# Patient Record
Sex: FEMALE | Race: BLACK OR AFRICAN AMERICAN | Employment: FULL TIME | URBAN - METROPOLITAN AREA
[De-identification: names, ages, dates, MRNs, and addresses within clinical notes are randomized per-mention and may not be internally consistent; named-entity substitution may affect disease eponyms.]

---

## 2019-10-28 ENCOUNTER — APPOINTMENT (OUTPATIENT)
Dept: GENERAL RADIOLOGY | Age: 39
End: 2019-10-28
Attending: PHYSICIAN ASSISTANT
Payer: SELF-PAY

## 2019-10-28 ENCOUNTER — HOSPITAL ENCOUNTER (EMERGENCY)
Age: 39
Discharge: HOME | End: 2019-10-28
Attending: EMERGENCY MEDICINE | Admitting: EMERGENCY MEDICINE
Payer: SELF-PAY

## 2019-10-28 VITALS
OXYGEN SATURATION: 98 % | SYSTOLIC BLOOD PRESSURE: 138 MMHG | RESPIRATION RATE: 16 BRPM | TEMPERATURE: 98 F | DIASTOLIC BLOOD PRESSURE: 84 MMHG | HEART RATE: 80 BPM

## 2019-10-28 DIAGNOSIS — M79.605 LEFT LEG PAIN: ICD-10-CM

## 2019-10-28 DIAGNOSIS — M54.2 NECK PAIN ON LEFT SIDE: ICD-10-CM
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
## 2019-10-28 DIAGNOSIS — M54.6 ACUTE LEFT-SIDED THORACIC BACK PAIN: ICD-10-CM

## 2019-10-28 PROCEDURE — 99283 EMERGENCY DEPT VISIT LOW MDM: CPT

## 2019-10-28 PROCEDURE — 72072 X-RAY EXAM THORAC SPINE 3VWS: CPT

## 2019-10-28 PROCEDURE — 72050 X-RAY EXAM NECK SPINE 4/5VWS: CPT

## 2019-10-28 PROCEDURE — 73502 X-RAY EXAM HIP UNI 2-3 VIEWS: CPT

## 2019-10-28 PROCEDURE — 73030 X-RAY EXAM OF SHOULDER: CPT

## 2019-10-28 PROCEDURE — 74011250637 HC RX REV CODE- 250/637: Performed by: PHYSICIAN ASSISTANT

## 2019-10-28 PROCEDURE — 73552 X-RAY EXAM OF FEMUR 2/>: CPT

## 2019-10-28 RX ORDER — IBUPROFEN 600 MG/1
600 TABLET ORAL
Status: COMPLETED | OUTPATIENT
Start: 2019-10-28 | End: 2019-10-28

## 2019-10-28 RX ORDER — METHOCARBAMOL 750 MG/1
750 TABLET, FILM COATED ORAL 4 TIMES DAILY
Qty: 15 TAB | Refills: 0 | Status: SHIPPED | OUTPATIENT
Start: 2019-10-28 | End: 2019-10-28

## 2019-10-28 RX ORDER — NAPROXEN 500 MG/1
500 TABLET ORAL 2 TIMES DAILY WITH MEALS
Qty: 20 TAB | Refills: 0 | Status: SHIPPED | OUTPATIENT
Start: 2019-10-28 | End: 2019-10-28

## 2019-10-28 RX ORDER — METHOCARBAMOL 750 MG/1
750 TABLET, FILM COATED ORAL 4 TIMES DAILY
Qty: 15 TAB | Refills: 0 | Status: SHIPPED | OUTPATIENT
Start: 2019-10-28 | End: 2021-07-30

## 2019-10-28 RX ORDER — NAPROXEN 500 MG/1
500 TABLET ORAL 2 TIMES DAILY WITH MEALS
Qty: 20 TAB | Refills: 0 | Status: SHIPPED | OUTPATIENT
Start: 2019-10-28 | End: 2019-11-07

## 2019-10-28 RX ADMIN — IBUPROFEN 600 MG: 600 TABLET, FILM COATED ORAL at 09:35
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
## 2019-10-28 NOTE — ED NOTES
Patient given discharge instructions and prescriptions with work note. Pt verbalized understanding and ambulated out of ER with son.

## 2019-10-28 NOTE — LETTER
Luz Gutierrez 53 
[address] [facility] [address] [id number] 
[phone] Work/School Note Date: 10/28/2019 To Whom It May concern: 
 
Trav Garcias was seen and treated today in the emergency room by the following provider(s): 
Attending Provider: Aide Cabrera MD 
Physician Assistant: Rex Herrera. Trav Garcias may return to work on Wednesday October 30, 2019 Sincerely, Megan HERNANDEZ [facility]

## 2019-10-28 NOTE — DISCHARGE INSTRUCTIONS
Patient Education        Back Pain: Care Instructions  Your Care Instructions    Back pain has many possible causes. It is often related to problems with muscles and ligaments of the back. It may also be related to problems with the nerves, discs, or bones of the back. Moving, lifting, standing, sitting, or sleeping in an awkward way can strain the back. Sometimes you don't notice the injury until later. Arthritis is another common cause of back pain. Although it may hurt a lot, back pain usually improves on its own within several weeks. Most people recover in 12 weeks or less. Using good home treatment and being careful not to stress your back can help you feel better sooner. Follow-up care is a key part of your treatment and safety. Be sure to make and go to all appointments, and call your doctor if you are having problems. It's also a good idea to know your test results and keep a list of the medicines you take. How can you care for yourself at home? · Sit or lie in positions that are most comfortable and reduce your pain. Try one of these positions when you lie down:  ? Lie on your back with your knees bent and supported by large pillows. ? Lie on the floor with your legs on the seat of a sofa or chair. ? Lie on your side with your knees and hips bent and a pillow between your legs. ? Lie on your stomach if it does not make pain worse. · Do not sit up in bed, and avoid soft couches and twisted positions. Bed rest can help relieve pain at first, but it delays healing. Avoid bed rest after the first day of back pain. · Change positions every 30 minutes. If you must sit for long periods of time, take breaks from sitting. Get up and walk around, or lie in a comfortable position. · Try using a heating pad on a low or medium setting for 15 to 20 minutes every 2 or 3 hours. Try a warm shower in place of one session with the heating pad. · You can also try an ice pack for 10 to 15 minutes every 2 to 3 hours. Put a thin cloth between the ice pack and your skin. · Take pain medicines exactly as directed. ? If the doctor gave you a prescription medicine for pain, take it as prescribed. ? If you are not taking a prescription pain medicine, ask your doctor if you can take an over-the-counter medicine. · Take short walks several times a day. You can start with 5 to 10 minutes, 3 or 4 times a day, and work up to longer walks. Walk on level surfaces and avoid hills and stairs until your back is better. · Return to work and other activities as soon as you can. Continued rest without activity is usually not good for your back. · To prevent future back pain, do exercises to stretch and strengthen your back and stomach. Learn how to use good posture, safe lifting techniques, and proper body mechanics. When should you call for help? Call your doctor now or seek immediate medical care if:    · You have new or worsening numbness in your legs.     · You have new or worsening weakness in your legs. (This could make it hard to stand up.)     · You lose control of your bladder or bowels.    Watch closely for changes in your health, and be sure to contact your doctor if:    · You have a fever, lose weight, or don't feel well.     · You do not get better as expected. Where can you learn more? Go to http://elisa-buffy.info/. Enter W154 in the search box to learn more about \"Back Pain: Care Instructions. \"  Current as of: June 26, 2019  Content Version: 12.2  © 7363-2885 [employer]. Care instructions adapted under license by [employer] (which disclaims liability or warranty for this information). If you have questions about a medical condition or this instruction, always ask your healthcare professional. [place] [address] any warranty or liability for your use of this information.

## 2020-08-05 ENCOUNTER — VIRTUAL VISIT (OUTPATIENT)
Dept: FAMILY MEDICINE CLINIC | Age: 40
End: 2020-08-05
Payer: COMMERCIAL

## 2020-08-05 ENCOUNTER — TELEPHONE (OUTPATIENT)
Dept: FAMILY MEDICINE CLINIC | Age: 40
End: 2020-08-05

## 2020-08-05 DIAGNOSIS — H52.13 MYOPIA OF BOTH EYES: ICD-10-CM

## 2020-08-05 DIAGNOSIS — H53.9 VISUAL CHANGES: ICD-10-CM

## 2020-08-05 DIAGNOSIS — Z13.1 SCREENING FOR DIABETES MELLITUS: ICD-10-CM

## 2020-08-05 DIAGNOSIS — I10 ESSENTIAL HYPERTENSION: ICD-10-CM

## 2020-08-05 DIAGNOSIS — I10 ESSENTIAL HYPERTENSION: Primary | ICD-10-CM

## 2020-08-05 DIAGNOSIS — Z13.220 SCREENING FOR LIPID DISORDERS: ICD-10-CM

## 2020-08-05 PROCEDURE — 99204 OFFICE O/P NEW MOD 45 MIN: CPT | Performed by: FAMILY MEDICINE

## 2020-08-05 PROCEDURE — G0447 BEHAVIOR COUNSEL OBESITY 15M: HCPCS | Performed by: FAMILY MEDICINE

## 2020-08-05 RX ORDER — VALSARTAN AND HYDROCHLOROTHIAZIDE 160; 12.5 MG/1; MG/1
1 TABLET, FILM COATED ORAL DAILY
COMMUNITY
End: 2020-11-27 | Stop reason: SDUPTHER

## 2020-08-05 NOTE — PROGRESS NOTES
Chief Complaint   Patient presents with   [address] [place] [address]   1. Have you been to the ER, urgent care clinic since your last visit? Hospitalized since your last visit? No    2. Have you seen or consulted any other health care providers outside of the [address] [place] [address] since your last visit? Include any pap smears or colon screening.  No

## 2020-08-05 NOTE — TELEPHONE ENCOUNTER
----- Message from Yarely Daigle MD sent at 2020 12:18 PM EDT -----  Regardin wk follow up appt  Please assist pt in getting set up for an in person visit for bp/weight

## 2020-08-05 NOTE — TELEPHONE ENCOUNTER
----- Message from Yoselin Delong MD sent at 8/5/2020 12:16 PM EDT -----  Regarding: fax [facility] near [place], she plans to go for fasting labs this friday

## 2020-08-05 NOTE — TELEPHONE ENCOUNTER
Called pt, and left a voice message, advising I was calling to schedule her a follow up with Dr. Jemima Sena. Advised pt to call office back to schedule.

## 2020-08-05 NOTE — PROGRESS NOTES
Tiffanie Pierre is a 41 y.o. female who was seen by synchronous (real-time) audio-video technology on 8/5/2020. Consent: Tiffanie Pierre, who was seen by synchronous (real-time) audio-video technology, and/or her healthcare decision maker, is aware that this patient-initiated, Telehealth encounter on 8/5/2020 is a billable service, with coverage as determined by her insurance carrier. She is aware that she may receive a bill and has provided verbal consent to proceed: Yes. Assessment & Plan:   1. Essential hypertension  Her goal is to go off of medication ultimately. Discussed a goal of 10% weight loss, c/w medication for now and go for fasting lab work. - CBC W/O DIFF; Future  - METABOLIC PANEL, COMPREHENSIVE; Future  - LIPID PANEL; Future  - TSH 3RD GENERATION; Future  - REFERRAL TO NUTRITION    2. BMI 40.0-44.9, adult ([facility] [address]  As above, diet counseling and exercise counseling more than 20 minutes of time spent in that discussion today  - CBC W/O DIFF; Future  - METABOLIC PANEL, COMPREHENSIVE; Future  - LIPID PANEL; Future  - TSH 3RD GENERATION; Future  - REFERRAL TO NUTRITION    3. Myopia of both eyes  Recommend follow up with eye doctor    4. Visual changes  Recommend follow up with eye doctor    5. Screening for lipid disorders  Labs per orders, fasting    6. Screening for diabetes mellitus  Labs per orders, fasting      Pt was counseled on risks, benefits and alternatives of treatment options. All questions were asked and answered and the patient was agreeable with the treatment plan as outlined. Encounter time today was 55 minutes and more than 50% of this encounter was spent in counseling face-to-face regarding Diagnosis, Patient Education, Medication Management, Compliance and Impressions. Time documented includes face to face time, documentation and chart review if applicable except as noted. Subjective:    Tiffanie Pierre is a 41 y.o. 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 No chest pain, sob, headache, blurred vision, leg swelling, diaphoresis, falls. Compliant with medications as prescribed. Is only sometimes checking blood pressures at home and reports range is 130-140s/70-90. No significant hyper or hypotensive episodes that the patient reports today. Her goal is to come off of blood pressure medication. She reports that I was recommended to her because she wants to find someone who will help her come off meds if it is possible    Family hx of high blood pressure, and diabetes  Pt reports to me that she had blood work done, not recently  She denies personal hx of high cholesterol or diabetes    She reports she's \"just a little overweight for her height. \"  She is 231 and 5'2, her BMI 42    Diet, plans to eat more salads--she's been eating more potatoes (she cuts back on rice and she's eating potatoes but she could cut back on that)  She is increasing her water intake  She isn't eating fried foods    Worried about her eyes--having blurriness when she doesn't wear her glasses  Doesn't want to have to wear glasses but was told that her prescription had gone up a little bit.     Medications, allergies, PMH, PSH, SOCH, LJ [facility] reviewed and updated per routine protocol, see chart for review and changes if not noted here. ROS  A 12 point review of systems was negative except as noted here or in the HPI. Objective:   Vital Signs: (As obtained by patient/caregiver at home)  There were no vitals taken for this visit. [INSTRUCTIONS:  \"[x]\" Indicates a positive item  \"[]\" Indicates a negative item  -- DELETE ALL ITEMS NOT EXAMINED]    Constitutional: [x] Appears well-developed and well-nourished [x] No apparent distress      [] Abnormal -     Mental status: [x] Alert and awake  [x] Oriented to person/place/time [x] Able to follow commands    [] Abnormal -     Eyes:   EOM    [x]  Normal    [] Abnormal -   Sclera  [x]  Normal    [] Abnormal -          Discharge [x]  None visible   [] Abnormal -     HENT: [x] Normocephalic, atraumatic  [] Abnormal -   [x] Mouth/Throat: Mucous membranes are moist    External Ears [x] Normal  [] Abnormal -    Neck: [x] No visualized mass [] Abnormal -     Pulmonary/Chest: [x] Respiratory effort normal   [x] No visualized signs of difficulty breathing or respiratory distress        [] Abnormal -      Musculoskeletal:   [x] Normal gait with no signs of ataxia         [x] Normal range of motion of neck        [] Abnormal -     Neurological:        [x] No Facial Asymmetry (Cranial nerve 7 motor function) (limited exam due to video visit)          [x] No gaze palsy        [] Abnormal -          Skin:        [x] No significant exanthematous lesions or discoloration noted on facial skin         [] Abnormal -            Psychiatric:       [x] Normal Affect [] Abnormal -        [x] No Hallucinations    Other pertinent observable physical exam findings:obese, wearing glasses    We discussed the expected course, resolution and complications of the diagnosis(es) in detail. Medication risks, benefits, costs, interactions, and alternatives were discussed as indicated. I advised her to contact the office if her condition worsens, changes or fails to improve as anticipated.  She expressed understanding with the diagnosis(es) and plan. Carmen Reid is a 41 y.o. female who was evaluated by a video visit encounter for concerns as above. Patient identification was verified prior to start of the visit. A caregiver was present when appropriate. Due to this being a TeleHealth encounter (During [facility] public health emergency), evaluation of the following organ systems was limited: Vitals/Constitutional/EENT/Resp/CV/GI//MS/Neuro/Skin/Heme-Lymph-Imm. Pursuant to the emergency declaration under the [facility], [facility] waiver authority and the [employer] and [employer] Act, this Virtual  Visit was conducted, with patient's (and/or legal guardian's) consent, to reduce the patient's risk of exposure to COVID-19 and provide necessary medical care. Services were provided through a video synchronous discussion virtually to substitute for in-person clinic visit. Patient and provider were located at their individual homes. Liya Morin MD  [facility]  08/05/20 11:50 AM     Portions of this note may have been populated using smart dictation software and may have \"sounds-like\" errors present.

## 2020-10-23 ENCOUNTER — OFFICE VISIT (OUTPATIENT)
Dept: FAMILY MEDICINE CLINIC | Age: 40
End: 2020-10-23
Payer: COMMERCIAL

## 2020-10-23 VITALS
WEIGHT: 242 LBS | TEMPERATURE: 97.7 F | HEART RATE: 94 BPM | DIASTOLIC BLOOD PRESSURE: 83 MMHG | HEIGHT: 62 IN | RESPIRATION RATE: 18 BRPM | BODY MASS INDEX: 44.53 KG/M2 | OXYGEN SATURATION: 99 % | SYSTOLIC BLOOD PRESSURE: 135 MMHG

## 2020-10-23 DIAGNOSIS — Z23 ENCOUNTER FOR IMMUNIZATION: ICD-10-CM

## 2020-10-23 DIAGNOSIS — E66.01 OBESITY, MORBID (HCC): ICD-10-CM

## 2020-10-23 DIAGNOSIS — Z01.411 ENCOUNTER FOR WELL WOMAN EXAM WITH ABNORMAL FINDINGS: Primary | ICD-10-CM

## 2020-10-23 DIAGNOSIS — I10 ESSENTIAL HYPERTENSION: ICD-10-CM

## 2020-10-23 PROCEDURE — 99396 PREV VISIT EST AGE 40-64: CPT | Performed by: FAMILY MEDICINE

## 2020-10-23 PROCEDURE — 90471 IMMUNIZATION ADMIN: CPT | Performed by: FAMILY MEDICINE

## 2020-10-23 PROCEDURE — 99213 OFFICE O/P EST LOW 20 MIN: CPT | Performed by: FAMILY MEDICINE

## 2020-10-23 PROCEDURE — 90686 IIV4 VACC NO PRSV 0.5 ML IM: CPT | Performed by: FAMILY MEDICINE

## 2020-10-23 NOTE — PATIENT INSTRUCTIONS
1. Encounter for well woman exam with abnormal findings  Flu shot today  Dietary/exercise discussion today to promote healthy weight  Recommend labs per prior orders (Reprinted today) for routine health screening    2. Obesity, morbid ([facility] [address]  As above    3. Essential hypertension  bp at goal today, pt compliant with medication but with goal to come off of medications  Labs per orders    4.  BMI 40.0-44.9, adult ([facility] [address]  As above

## 2020-10-23 NOTE — PROGRESS NOTES
Family Medicine Follow-Up Progress Note  Patient: Evelyne Minors  1980, 41 y.o., female  Encounter Date: 10/23/2020    ASSESSMENT & PLAN    ICD-10-CM ICD-9-CM    1. Encounter for well woman exam with abnormal findings  Z01.411 V72.31    2. Obesity, morbid ([facility] [address]  E66.01 278.01    3. Essential hypertension  I10 401.9    4. BMI 40.0-44.9, adult ([facility])  Z68.41 V85.41          Patient Instructions   1. Encounter for well woman exam with abnormal findings  Flu shot today  Dietary/exercise discussion today to promote healthy weight  Recommend labs per prior orders (Reprinted today) for routine health screening    2. Obesity, morbid ([facility] [address]  As above    3. Essential hypertension  bp at goal today, pt compliant with medication but with goal to come off of medications  Labs per orders    4. BMI 40.0-44.9, adult ([facility] [address]  As above        CHIEF COMPLAINT  Chief Complaint   Patient presents with    Hypertension     follow up     Weight Management     follow up        Genie Martines is a 41 y.o. female presenting today for routine care as well as follow up (we had met previously virtually)  HM and measures are reviewed and updated  Pt usually declines flu shot, agrees this morning to it    HTN: patient reports stable on medications. No chest pain, sob, headache, blurred vision, leg swelling, diaphoresis, falls. Compliant with medications as prescribed. Is sometimes checking blood pressures at home and reports she feels her machine is unreliable. No significant hyper or hypotensive episodes that the patient reports today.     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 Her goal is that she'd like to get off the blood pressure medications      fhx of dm and htn which she thinks could be prevented with weight control        ROS  Review of Systems  A 12 point review of systems was negative except as noted here or in the HPI. OBJECTIVE  Visit Vitals  /83   Pulse 94   Temp 97.7 °F (36.5 °C) (Temporal)   Resp 18   Ht 5' 2\" (1.575 m)   Wt 242 lb (109.8 kg)   LMP 10/05/2020   SpO2 99%   BMI 44.26 kg/m²       Physical Exam  Vitals signs and nursing note reviewed. Constitutional:       General: She is not in acute distress. Appearance: Normal appearance. She is well-developed. She is obese. She is not diaphoretic. HENT:      Head: Normocephalic and atraumatic. Right Ear: External ear normal.      Left Ear: External ear normal.      Mouth/Throat:      Comments: Mask not removed  Eyes:      General: No scleral icterus. Right eye: No discharge. Left eye: No discharge. Extraocular Movements: Extraocular movements intact. Conjunctiva/sclera: Conjunctivae normal.      Pupils: Pupils are equal, round, and reactive to light. Neck:      Musculoskeletal: Normal range of motion and neck supple. Vascular: No carotid bruit. Cardiovascular:      Rate and Rhythm: Normal rate and regular rhythm. Heart sounds: Normal heart sounds. No murmur. No friction rub. No gallop. Pulmonary:      Effort: Pulmonary effort is normal. No respiratory distress. Breath sounds: Normal breath sounds. No stridor. No wheezing, rhonchi or rales. Abdominal:      General: Bowel sounds are normal. There is no distension. Palpations: Abdomen is soft. Tenderness: There is no abdominal tenderness. Musculoskeletal: Normal range of motion. General: No tenderness. Right lower leg: No edema.       Left lower leg: No edema. Lymphadenopathy:      Cervical: No cervical adenopathy. Skin:     General: Skin is warm and dry. Capillary Refill: Capillary refill takes less than 2 seconds. Findings: No rash. Neurological:      General: No focal deficit present. Mental Status: She is alert and oriented to person, place, and time. Coordination: Coordination normal.      Gait: Gait normal.   Psychiatric:         Mood and Affect: Mood normal.         Behavior: Behavior normal.         Thought Content: Thought content normal.         Judgment: Judgment normal.         No results found for any visits on 10/23/20.     HISTORICAL  Reviewed and updated today, and as noted below:    Past Medical History:   Diagnosis Date    Hypertension      Past Surgical History:   Procedure Laterality Date    HX BREAST REDUCTION      HX  SECTION       Family History   Problem Relation Age of Onset    Diabetes Mother     Hypertension Mother     Kidney Disease Mother     No Known Problems Father      Social History     Tobacco Use   Smoking Status Never Smoker   Smokeless Tobacco Never Used     Social History     Socioeconomic History    Marital status:      Spouse name: Not on file    Number of children: Not on file    Years of education: Not on file    Highest education level: Not on file   Tobacco Use    Smoking status: Never Smoker    Smokeless tobacco: Never Used   Substance and Sexual Activity    Alcohol use: Yes     Comment: socially    Drug use: Never     Allergies   Allergen Reactions    Lisinopril Swelling     Mouth swelling       LAB REVIEW  No results found for: NA, K, CL, CO2, AGAP, GLU, BUN, CREA, BUCR, GFRAA, GFRNA, CA, TBIL, TBILI, AP, TP, ALB, GLOB, AGRAT, ALT  Lab Results   Component Value Date/Time    WBC 9.7 2009 05:10 AM    HGB 10.8 (L) 2009 05:10 AM    HCT 30.8 (L) 2009 05:10 AM    PLATELET 137  05:10 AM    MCV 88.5 2009 05:10 AM     No results found for: HBA1C, HGBE8, UPM6RMYI, OAN6FIRN, EWO2SCIC  No results found for: CHOL, CHOLPOCT, CHOLX, CHLST, CHOLV, HDL, HDLPOC, HDLP, LDL, LDLCPOC, LDLC, DLDLP, VLDLC, VLDL, TGLX, TRIGL, TRIGP, TGLPOCT, CHHD, 62 Grisel Sultana MD  [facility]  10/23/20 8:15 AM    Portions of this note may have been populated using smart dictation software and may have \"sounds-like\" errors present. Pt was counseled on risks, benefits and alternatives of treatment options. All questions were asked and answered and the patient was agreeable with the treatment plan as outlined.

## 2020-10-23 NOTE — PROGRESS NOTES
Chief Complaint   Patient presents with    Hypertension     follow up     Weight Management     follow up

## 2020-11-27 RX ORDER — VALSARTAN AND HYDROCHLOROTHIAZIDE 160; 12.5 MG/1; MG/1
1 TABLET, FILM COATED ORAL DAILY
Qty: 90 TAB | Refills: 1 | Status: SHIPPED | OUTPATIENT
Start: 2020-11-27 | End: 2021-02-22 | Stop reason: SDUPTHER

## 2021-02-11 ENCOUNTER — TELEPHONE (OUTPATIENT)
Dept: FAMILY MEDICINE CLINIC | Age: 41
End: 2021-02-11

## 2021-02-11 NOTE — TELEPHONE ENCOUNTER
----- Message from Crista Alberts sent at 2/11/2021 11:59 AM EST -----  Regarding: Dr. Smith Flick: [phone]  General Message/Vendor Calls    Caller's first and last name: Pt      Reason for call: Questions on possible Side Effects to look out for on covid vaccine. 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 Pt did take her BP medication yesterday and only has some arm soreness. Pt would like to know of any other symptoms she should be on the look out for in case they happen.       Crista Alberts

## 2021-02-11 NOTE — TELEPHONE ENCOUNTER
Arm soreness is most common  The cdc website is a good resource  Encourage patient to sign up for vsafe so they can monitor any adverse effects over the next months for her

## 2021-02-22 RX ORDER — VALSARTAN AND HYDROCHLOROTHIAZIDE 160; 12.5 MG/1; MG/1
1 TABLET, FILM COATED ORAL DAILY
Qty: 90 TAB | Refills: 1 | Status: SHIPPED | OUTPATIENT
Start: 2021-02-22 | End: 2021-07-30 | Stop reason: SDUPTHER

## 2021-02-22 NOTE — TELEPHONE ENCOUNTER
----- Message from Liv Garber sent at 2/22/2021  1:44 PM EST -----  Regarding: Dr. Yohana Cuenca (if not patient):      Relationship of caller (if not patient):      Best contact number(s):[phone]      Name of medication and dosage if known: Valsartan HCTZ 160-12.5      Is patient out of this medication (yes/no):  No      Pharmacy name: [address] listed in chart? (yes/no):yes  Pharmacy phone number: [phone]       Details to clarify the request:n/a      Liv Garber

## 2021-07-30 ENCOUNTER — OFFICE VISIT (OUTPATIENT)
Dept: FAMILY MEDICINE CLINIC | Age: 41
End: 2021-07-30
Payer: COMMERCIAL

## 2021-07-30 VITALS
HEIGHT: 62 IN | BODY MASS INDEX: 44.16 KG/M2 | SYSTOLIC BLOOD PRESSURE: 128 MMHG | TEMPERATURE: 97.5 F | WEIGHT: 240 LBS | RESPIRATION RATE: 16 BRPM | OXYGEN SATURATION: 97 % | DIASTOLIC BLOOD PRESSURE: 85 MMHG | HEART RATE: 84 BPM

## 2021-07-30 DIAGNOSIS — Z11.59 ENCOUNTER FOR HEPATITIS C SCREENING TEST FOR LOW RISK PATIENT: ICD-10-CM

## 2021-07-30 DIAGNOSIS — I10 ESSENTIAL HYPERTENSION: Primary | ICD-10-CM

## 2021-07-30 DIAGNOSIS — Z13.220 SCREENING FOR LIPID DISORDERS: ICD-10-CM

## 2021-07-30 DIAGNOSIS — Z13.1 SCREENING FOR DIABETES MELLITUS: ICD-10-CM

## 2021-07-30 PROCEDURE — 99214 OFFICE O/P EST MOD 30 MIN: CPT | Performed by: FAMILY MEDICINE

## 2021-07-30 RX ORDER — VALSARTAN AND HYDROCHLOROTHIAZIDE 160; 12.5 MG/1; MG/1
1 TABLET, FILM COATED ORAL DAILY
Qty: 90 TABLET | Refills: 1 | Status: SHIPPED | OUTPATIENT
Start: 2021-07-30 | End: 2022-02-18 | Stop reason: SDUPTHER

## 2021-07-30 RX ORDER — DESONIDE 0.5 MG/G
CREAM TOPICAL
COMMUNITY
Start: 2021-05-10 | End: 2021-07-30

## 2021-07-30 NOTE — PROGRESS NOTES
Identified pt with two pt identifiers(name and ). Reviewed record in preparation for visit and have obtained necessary documentation. Chief Complaint   Patient presents with    Hypertension    Weight Management        Health Maintenance Due   Topic    Hepatitis C Screening     COVID-19 Vaccine (1)    DTaP/Tdap/Td series (1 - Tdap)    PAP AKA CERVICAL CYTOLOGY     Lipid Screen         Visit Vitals  /85 (BP 1 Location: Left upper arm, BP Patient Position: Sitting, BP Cuff Size: Large adult)   Pulse 84   Temp 97.5 °F (36.4 °C) (Temporal)   Resp 16   Ht 5' 2\" (1.575 m)   Wt 240 lb (108.9 kg)   LMP 2021   SpO2 97%   BMI 43.90 kg/m²     Pain Scale: 0 - No pain/10    Coordination of Care Questionnaire:  :   1. Have you been to the ER, urgent care clinic since your last visit? Hospitalized since your last visit? No    2. Have you seen or consulted any other health care providers outside of the [address] [place] [address] since your last visit? Include any pap smears or colon screening.  No

## 2021-07-30 NOTE — PROGRESS NOTES
Family Medicine Follow-Up Progress Note  Patient: Radha Little  1980, 35 y.o., female  Encounter Date: 7/30/2021    ASSESSMENT & PLAN    ICD-10-CM ICD-9-CM    1. Essential hypertension  I10 401.9 CBC W/O DIFF      METABOLIC PANEL, COMPREHENSIVE      LIPID PANEL      TSH 3RD GENERATION      CBC W/O DIFF      METABOLIC PANEL, COMPREHENSIVE      LIPID PANEL      TSH 3RD GENERATION   2. BMI 40.0-44.9, adult (HCC)  Z68.41 V85.41 TSH 3RD GENERATION      TSH 3RD GENERATION   3. Screening for lipid disorders  C96.167 K51.23 METABOLIC PANEL, COMPREHENSIVE      LIPID PANEL      METABOLIC PANEL, COMPREHENSIVE      LIPID PANEL   4. Screening for diabetes mellitus  Z13.1 V77.1 CBC W/O DIFF      METABOLIC PANEL, COMPREHENSIVE      CBC W/O DIFF      METABOLIC PANEL, COMPREHENSIVE   5. Encounter for hepatitis C screening test for low risk patient  Z11.59 V73.89 HEPATITIS C AB      HEPATITIS C AB       Orders Placed This Encounter    CBC W/O DIFF     Standing Status:   Future     Number of Occurrences:   1     Standing Expiration Date:   8/97/4759    METABOLIC PANEL, COMPREHENSIVE     Standing Status:   Future     Number of Occurrences:   1     Standing Expiration Date:   7/30/2022    LIPID PANEL     Standing Status:   Future     Number of Occurrences:   1     Standing Expiration Date:   7/30/2022    TSH 3RD GENERATION     Standing Status:   Future     Number of Occurrences:   1     Standing Expiration Date:   7/30/2022    HEPATITIS C AB     Standing Status:   Future     Number of Occurrences:   1     Standing Expiration Date:   7/30/2022    DISCONTD: desonide (TRIDESILON) 0.05 % cream     Sig: APPLY TO AFFECTED AREA ON FACE TWICE A DAY AS NEEDED    valsartan-hydroCHLOROthiazide (DIOVAN-HCT) 160-12.5 mg per tablet     Sig: Take 1 Tablet by mouth daily.      Dispense:  90 Tablet     Refill:  1     Labs per orders  C/w healthy eating plan, increase exercise--goal to lose some weight and get off bp med if possible  For now htn controlled, will continue to watch this  279 [facility]  Chief Complaint   Patient presents with    Hypertension    Weight Management       SUBJECTIVE  Radha Little is a 35 y.o. female presenting today for follow uo  Weight-down 2 lbs from last year    HTN: patient reports stable on medications. No chest pain, sob, headache, blurred vision, leg swelling, diaphoresis, falls. Compliant with medications as prescribed. is checking blood pressures at home and reports range is normal. No significant hyper or hypotensive episodes that the patient reports today. 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 OBJECTIVE  Visit Vitals  /85 (BP 1 Location: Left upper arm, BP Patient Position: Sitting, BP Cuff Size: Large adult)   Pulse 84   Temp 97.5 °F (36.4 °C) (Temporal)   Resp 16   Ht 5' 2\" (1.575 m)   Wt 240 lb (108.9 kg)   LMP 07/05/2021   SpO2 97%   BMI 43.90 kg/m²       Physical Exam  Vitals and nursing note reviewed. Constitutional:       General: She is not in acute distress. Appearance: Normal appearance. She is not ill-appearing, toxic-appearing or diaphoretic. HENT:      Head: Normocephalic and atraumatic. Right Ear: External ear normal.      Left Ear: External ear normal.      Mouth/Throat:      Mouth: Mucous membranes are moist.   Eyes:      General: No scleral icterus. Right eye: No discharge. Left eye: No discharge. Comments: eom grossly intact   Neck:      Comments: No visible neck masses , ROM appears normal from visual inspection  Cardiovascular:      Rate and Rhythm: Normal rate and regular rhythm. Heart sounds: No murmur heard.    No friction rub. No gallop. Pulmonary:      Effort: Pulmonary effort is normal. No respiratory distress. Breath sounds: No stridor. No wheezing, rhonchi or rales. Musculoskeletal:      Right lower leg: No edema. Left lower leg: No edema. Skin:     Capillary Refill: Capillary refill takes less than 2 seconds. Comments: Visible skin is without jaundice, bruising, lesion, pallor, erythema or rash except as otherwise noted   Neurological:      General: No focal deficit present. Mental Status: She is alert and oriented to person, place, and time. Psychiatric:         Mood and Affect: Mood normal.         Behavior: Behavior normal.         Thought Content: Thought content normal.         Judgment: Judgment normal.         No results found for any visits on 21.     HISTORICAL  Reviewed and updated today, and as noted below:    Past Medical History:   Diagnosis Date    Hypertension      Past Surgical History:   Procedure Laterality Date    HX BREAST REDUCTION      HX  SECTION  2009     Family History   Problem Relation Age of Onset    Diabetes Mother     Hypertension Mother     Kidney Disease Mother     No Known Problems Father      Social History     Tobacco Use   Smoking Status Never Smoker   Smokeless Tobacco Never Used     Social History     Socioeconomic History    Marital status:      Spouse name: Not on file    Number of children: Not on file    Years of education: Not on file    Highest education level: Not on file   Tobacco Use    Smoking status: Never Smoker    Smokeless tobacco: Never Used   Substance and Sexual Activity    Alcohol use: Yes     Comment: socially    Drug use: Never     Social Determinants of Health     Financial Resource Strain:     Difficulty of Paying Living Expenses:    Food Insecurity:     Worried About Running Out of Food in the Last Year:     Ran Out of Food in the Last Year:    Transportation Needs:     Lack of Transportation (Medical):  Lack of Transportation (Non-Medical):    Physical Activity:     Days of Exercise per Week:     Minutes of Exercise per Session:    Stress:     Feeling of Stress :    Social Connections:     Frequency of Communication with Friends and Family:     Frequency of Social Gatherings with Friends and Family:     Attends Christianity Services:     Active Member of Clubs or Organizations:     Attends Club or Organization Meetings:     Marital Status:      Allergies   Allergen Reactions    Lisinopril Swelling     Mouth swelling       LAB REVIEW  No results found for: NA, K, CL, CO2, AGAP, GLU, BUN, CREA, BUCR, GFRAA, GFRNA, CA, TBIL, TBILI, AP, TP, ALB, GLOB, AGRAT, ALT  Lab Results   Component Value Date/Time    WBC 9.7 09/20/2009 05:10 AM    HGB 10.8 (L) 09/20/2009 05:10 AM    HCT 30.8 (L) 09/20/2009 05:10 AM    PLATELET 601 92/63/3601 05:10 AM    MCV 88.5 09/20/2009 05:10 AM     No results found for: HBA1C, DIA0OWJP, OSK8QMYQ, ZHD3UMUN  No results found for: CHOL, CHOLPOCT, CHOLX, CHLST, CHOLV, HDL, HDLPOC, HDLP, LDL, LDLCPOC, LDLC, DLDLP, VLDLC, VLDL, TGLX, TRIGL, TRIGP, TGLPOCT, CHHD, CHHDX        Roro Llamas MD  [facility]  07/30/21 11:57 AM    Portions of this note may have been populated using smart dictation software and may have \"sounds-like\" errors present. Pt was counseled on risks, benefits and alternatives of treatment options. All questions were asked and answered and the patient was agreeable with the treatment plan as outlined.

## 2021-07-31 LAB
ALBUMIN SERPL-MCNC: 4.5 G/DL (ref 3.8–4.8)
ALBUMIN/GLOB SERPL: 1.4 {RATIO} (ref 1.2–2.2)
ALP SERPL-CCNC: 80 IU/L (ref 48–121)
ALT SERPL-CCNC: 9 IU/L (ref 0–32)
AST SERPL-CCNC: 13 IU/L (ref 0–40)
BILIRUB SERPL-MCNC: 0.3 MG/DL (ref 0–1.2)
BUN SERPL-MCNC: 10 MG/DL (ref 6–24)
BUN/CREAT SERPL: 10 (ref 9–23)
CALCIUM SERPL-MCNC: 9.8 MG/DL (ref 8.7–10.2)
CHLORIDE SERPL-SCNC: 100 MMOL/L (ref 96–106)
CHOLEST SERPL-MCNC: 182 MG/DL (ref 100–199)
CO2 SERPL-SCNC: 26 MMOL/L (ref 20–29)
CREAT SERPL-MCNC: 0.97 MG/DL (ref 0.57–1)
ERYTHROCYTE [DISTWIDTH] IN BLOOD BY AUTOMATED COUNT: 13.1 % (ref 11.7–15.4)
GLOBULIN SER CALC-MCNC: 3.2 G/DL (ref 1.5–4.5)
GLUCOSE SERPL-MCNC: 90 MG/DL (ref 65–99)
HCT VFR BLD AUTO: 44.3 % (ref 34–46.6)
HCV AB S/CO SERPL IA: <0.1 S/CO RATIO (ref 0–0.9)
HDLC SERPL-MCNC: 46 MG/DL
HGB BLD-MCNC: 14.9 G/DL (ref 11.1–15.9)
IMP & REVIEW OF LAB RESULTS: NORMAL
LDLC SERPL CALC-MCNC: 120 MG/DL (ref 0–99)
MCH RBC QN AUTO: 30.7 PG (ref 26.6–33)
MCHC RBC AUTO-ENTMCNC: 33.6 G/DL (ref 31.5–35.7)
MCV RBC AUTO: 91 FL (ref 79–97)
PLATELET # BLD AUTO: 248 X10E3/UL (ref 150–450)
POTASSIUM SERPL-SCNC: 4.2 MMOL/L (ref 3.5–5.2)
PROT SERPL-MCNC: 7.7 G/DL (ref 6–8.5)
RBC # BLD AUTO: 4.85 X10E6/UL (ref 3.77–5.28)
SODIUM SERPL-SCNC: 141 MMOL/L (ref 134–144)
TRIGL SERPL-MCNC: 89 MG/DL (ref 0–149)
TSH SERPL DL<=0.005 MIU/L-ACNC: 3.31 UIU/ML (ref 0.45–4.5)
VLDLC SERPL CALC-MCNC: 16 MG/DL (ref 5–40)
WBC # BLD AUTO: 6.8 X10E3/UL (ref 3.4–10.8)

## 2022-02-18 ENCOUNTER — OFFICE VISIT (OUTPATIENT)
Dept: FAMILY MEDICINE CLINIC | Age: 42
End: 2022-02-18
Payer: COMMERCIAL

## 2022-02-18 VITALS
SYSTOLIC BLOOD PRESSURE: 130 MMHG | BODY MASS INDEX: 43.61 KG/M2 | WEIGHT: 237 LBS | RESPIRATION RATE: 16 BRPM | TEMPERATURE: 98 F | DIASTOLIC BLOOD PRESSURE: 78 MMHG | HEIGHT: 62 IN | HEART RATE: 88 BPM

## 2022-02-18 DIAGNOSIS — I10 ESSENTIAL HYPERTENSION: Primary | ICD-10-CM

## 2022-02-18 DIAGNOSIS — E78.00 PURE HYPERCHOLESTEROLEMIA: ICD-10-CM

## 2022-02-18 DIAGNOSIS — E66.01 OBESITY, MORBID (HCC): ICD-10-CM

## 2022-02-18 PROCEDURE — 99214 OFFICE O/P EST MOD 30 MIN: CPT | Performed by: FAMILY MEDICINE

## 2022-02-18 RX ORDER — VALSARTAN AND HYDROCHLOROTHIAZIDE 160; 12.5 MG/1; MG/1
1 TABLET, FILM COATED ORAL DAILY
Qty: 90 TABLET | Refills: 3 | Status: SHIPPED | OUTPATIENT
Start: 2022-02-18 | End: 2022-07-29 | Stop reason: SDUPTHER

## 2022-02-18 NOTE — PROGRESS NOTES
Family Medicine Follow-Up Progress Note  Patient: Tarry Buerger  1980, 35 y.o., female  Encounter Date: 2/18/2022    ASSESSMENT & PLAN    ICD-10-CM ICD-9-CM    1. Essential hypertension  I10 401.9 valsartan-hydroCHLOROthiazide (DIOVAN-HCT) 160-12.5 mg per tablet      CBC W/O DIFF      METABOLIC PANEL, COMPREHENSIVE      LIPID PANEL      CBC W/O DIFF      METABOLIC PANEL, COMPREHENSIVE      LIPID PANEL   2. Obesity, morbid (HCC)  E66.01 278.01    3. Pure hypercholesterolemia  R68.88 604.6 METABOLIC PANEL, COMPREHENSIVE      LIPID PANEL      METABOLIC PANEL, COMPREHENSIVE      LIPID PANEL       Orders Placed This Encounter    CBC W/O DIFF     Standing Status:   Future     Number of Occurrences:   1     Standing Expiration Date:   6/52/8019    METABOLIC PANEL, COMPREHENSIVE     Standing Status:   Future     Number of Occurrences:   1     Standing Expiration Date:   2/18/2023    LIPID PANEL     Standing Status:   Future     Number of Occurrences:   1     Standing Expiration Date:   2/18/2023    valsartan-hydroCHLOROthiazide (DIOVAN-HCT) 160-12.5 mg per tablet     Sig: Take 1 Tablet by mouth daily. Dispense:  90 Tablet     Refill:  3       Patient Instructions   bp is at goal or near goal  C/w diovan hct  Labs per orders prior to next visit  Weight goal: 20 lbs weight loss in the next 16-20 weeks, rate of 1-1.5 lbs per week, look at diet cut 300 maryam / day mindful snacking  Add exercise back in at least 30 min / day 5 days per week of moderate exercise  Lipids prior to next visit--FASTING    Keep up the good work! CHIEF COMPLAINT  Chief Complaint   Patient presents with    Hypertension     med compliant and bp wnl        SUBJECTIVE  Tarry Buerger is a 35 y.o. female presenting today for follow up  HTN: patient reports stable on medications. No chest pain, sob, headache, blurred vision, leg swelling, diaphoresis, falls. Compliant with medications as prescribed.  is checking blood pressures at home and reports range is 120s/70s. No significant hyper or hypotensive episodes that the patient reports today. She did eat out 2x last week, she thinks that's running it a little higher    Due for pap, she's going to go  She's due for booster now, she's going to go      ROS  Review of Systems  A 12 point review of systems was negative except as noted here or in the HPI. OBJECTIVE  Visit Vitals  /78   Pulse 88   Temp 98 °F (36.7 °C)   Resp 16   Ht 5' 2\" (1.575 m)   Wt 237 lb (107.5 kg)   LMP 01/17/2022   BMI 43.35 kg/m²       Physical Exam  Vitals reviewed. Constitutional:       General: She is not in acute distress. Appearance: Normal appearance. She is obese. She is not ill-appearing, toxic-appearing or diaphoretic. HENT:      Head: Normocephalic and atraumatic. Right Ear: External ear normal.      Left Ear: External ear normal.   Eyes:      General: No scleral icterus. Right eye: No discharge. Left eye: No discharge. Comments: eom grossly intact   Neck:      Comments: No visible neck masses , ROM appears normal from visual inspection  Cardiovascular:      Rate and Rhythm: Normal rate and regular rhythm. Heart sounds: No murmur heard. No friction rub. No gallop. Pulmonary:      Effort: Pulmonary effort is normal. No respiratory distress. Breath sounds: No stridor. No wheezing or rhonchi. Abdominal:      General: Bowel sounds are normal. There is no distension. Palpations: Abdomen is soft. There is no mass. Musculoskeletal:      Right lower leg: No edema. Left lower leg: No edema. Skin:     General: Skin is warm and dry. Capillary Refill: Capillary refill takes less than 2 seconds. Comments: Visible skin is without jaundice, bruising, lesion, pallor, erythema or rash except as otherwise noted   Neurological:      General: No focal deficit present. Mental Status: She is alert and oriented to person, place, and time.    Psychiatric: Mood and Affect: Mood normal.         Behavior: Behavior normal.         Thought Content: Thought content normal.         Judgment: Judgment normal.         No results found for any visits on 22. HISTORICAL  Reviewed and updated today, and as noted below:    Past Medical History:   Diagnosis Date    Hypertension      Past Surgical History:   Procedure Laterality Date    HX BREAST REDUCTION      HX  SECTION  2009     Family History   Problem Relation Age of Onset    Diabetes Mother     Hypertension Mother     Kidney Disease Mother     No Known Problems Father      Social History     Tobacco Use   Smoking Status Never Smoker   Smokeless Tobacco Never Used     Social History     Socioeconomic History    Marital status:    Tobacco Use    Smoking status: Never Smoker    Smokeless tobacco: Never Used   Substance and Sexual Activity    Alcohol use: Yes     Comment: socially    Drug use: Never     Allergies   Allergen Reactions    Lisinopril Swelling     Mouth swelling       LAB REVIEW  Lab Results   Component Value Date/Time    Sodium 141 2021 01:04 PM    Potassium 4.2 2021 01:04 PM    Chloride 100 2021 01:04 PM    CO2 26 2021 01:04 PM    Glucose 90 2021 01:04 PM    BUN 10 2021 01:04 PM    Creatinine 0.97 2021 01:04 PM    BUN/Creatinine ratio 10 2021 01:04 PM    GFR est AA 83 2021 01:04 PM    GFR est non-AA 72 2021 01:04 PM    Calcium 9.8 2021 01:04 PM    Bilirubin, total 0.3 2021 01:04 PM    Alk.  phosphatase 80 2021 01:04 PM    Protein, total 7.7 2021 01:04 PM    Albumin 4.5 2021 01:04 PM    A-G Ratio 1.4 2021 01:04 PM    ALT (SGPT) 9 2021 01:04 PM     Lab Results   Component Value Date/Time    WBC 6.8 2021 01:04 PM    HGB 14.9 2021 01:04 PM    HCT 44.3 2021 01:04 PM    PLATELET 724 15/98/9549 01:04 PM    MCV 91 2021 01:04 PM     No results found for: HBA1C, MEE0JCXJ, ARD8VDAJ, PNW4NCAA  Lab Results   Component Value Date/Time    Cholesterol, total 182 07/30/2021 01:04 PM    HDL Cholesterol 46 07/30/2021 01:04 PM    LDL, calculated 120 (H) 07/30/2021 01:04 PM    VLDL, calculated 16 07/30/2021 01:04 PM    Triglyceride 89 07/30/2021 01:04 PM           Nir Jerez MD  [facility]  02/18/22 8:28 AM    Portions of this note may have been populated using smart dictation software and may have \"sounds-like\" errors present. Pt was counseled on risks, benefits and alternatives of treatment options. All questions were asked and answered and the patient was agreeable with the treatment plan as outlined.

## 2022-02-18 NOTE — PATIENT INSTRUCTIONS
bp is at goal or near goal  C/w diovan hct  Labs per orders prior to next visit  Weight goal: 20 lbs weight loss in the next 16-20 weeks, rate of 1-1.5 lbs per week, look at diet cut 300 maryam / day mindful snacking  Add exercise back in at least 30 min / day 5 days per week of moderate exercise  Lipids prior to next visit--FASTING    Keep up the good work!

## 2022-03-19 PROBLEM — E66.01 OBESITY, MORBID (HCC): Status: ACTIVE | Noted: 2020-10-23

## 2022-07-29 DIAGNOSIS — I10 ESSENTIAL HYPERTENSION: ICD-10-CM

## 2022-07-29 RX ORDER — VALSARTAN AND HYDROCHLOROTHIAZIDE 160; 12.5 MG/1; MG/1
1 TABLET, FILM COATED ORAL DAILY
Qty: 90 TABLET | Refills: 3 | Status: SHIPPED | OUTPATIENT
Start: 2022-07-29

## 2022-07-29 NOTE — TELEPHONE ENCOUNTER
----- Message from Aleedavidepriscila Amos sent at 7/29/2022 10:00 AM EDT -----  Subject: Refill Request    QUESTIONS  Name of Medication? valsartan-hydroCHLOROthiazide (DIOVAN-HCT) 160-12.5 mg   per tablet  Patient-reported dosage and instructions? 1 TAB A DAY  How many days do you have left? 27  Preferred Pharmacy? [id number]  Pharmacy phone number (if available)? [phone]  Additional Information for Provider? PT NEEDS A REFILL HAS AN APPT ON   9-2-2022   ---------------------------------------------------------------------------  --------------  CALL BACK INFO  What is the best way for the office to contact you? OK to leave message on   voicemail  Preferred Call Back Phone Number? [phone]  ---------------------------------------------------------------------------  --------------  SCRIPT ANSWERS  Relationship to Patient?  Self

## 2022-09-02 ENCOUNTER — OFFICE VISIT (OUTPATIENT)
Dept: FAMILY MEDICINE CLINIC | Age: 42
End: 2022-09-02
Payer: COMMERCIAL

## 2022-09-02 VITALS
TEMPERATURE: 97.5 F | RESPIRATION RATE: 18 BRPM | DIASTOLIC BLOOD PRESSURE: 90 MMHG | HEIGHT: 62 IN | SYSTOLIC BLOOD PRESSURE: 133 MMHG | BODY MASS INDEX: 44.9 KG/M2 | HEART RATE: 77 BPM | OXYGEN SATURATION: 99 % | WEIGHT: 244 LBS

## 2022-09-02 DIAGNOSIS — I10 ESSENTIAL HYPERTENSION: Primary | ICD-10-CM

## 2022-09-02 DIAGNOSIS — E66.01 OBESITY, MORBID (HCC): ICD-10-CM

## 2022-09-02 PROCEDURE — 99213 OFFICE O/P EST LOW 20 MIN: CPT | Performed by: FAMILY MEDICINE

## 2022-09-02 NOTE — PATIENT INSTRUCTIONS
Go for labs previously ordered    C/w bp meds    Work on exercise next month     Check back in on bp and weight    Consideration of weight lowering medications  Can talk in the future if desired about referral to weight loss med or surgery if you'd like

## 2022-09-02 NOTE — PROGRESS NOTES
Josias Currie is a 38 y.o. female    Chief Complaint   Patient presents with    Medication Refill       Visit Vitals  BP (!) 133/90   Pulse 77   Temp 97.5 °F (36.4 °C) (Temporal)   Resp 18   Ht 5' 2\" (1.575 m)   Wt 244 lb (110.7 kg)   SpO2 99%   BMI 44.63 kg/m²       3 most recent PHQ Screens 9/2/2022   Little interest or pleasure in doing things Not at all   Feeling down, depressed, irritable, or hopeless Not at all   Total Score PHQ 2 0       No flowsheet data found. No flowsheet data found. 1. Have you been to the ER, urgent care clinic since your last visit? Hospitalized since your last visit? No     2. Have you seen or consulted any other health care providers outside of the [address] [place] [address] since your last visit? Include any pap smears or colon screening.  No

## 2022-09-02 NOTE — PROGRESS NOTES
Family Medicine Follow-Up Progress Note  Patient: Josias Currie  1980, 38 y.o., female  Encounter Date: 9/2/2022    ASSESSMENT & PLAN    ICD-10-CM ICD-9-CM    1. Essential hypertension  I10 401.9       2. Obesity, morbid ([facility] [address]  E66.01 278.01             Patient Instructions   Go for labs previously ordered    C/w bp meds    Work on exercise next month     Check back in on bp and weight    Consideration of weight lowering medications  Can talk in the future if desired about referral to weight loss med or surgery if you'd like     [facility]  Chief Complaint   Patient presents with    Medication Refill       Edy Steve is a 38 y.o. female presenting today for follow up  HTN: patient reports stable on medications. No chest pain, sob, headache, blurred vision, leg swelling, diaphoresis, falls. Compliant with medications as prescribed. is checking blood pressures at home and reports range is 120-130/70-80. No significant hyper or hypotensive episodes that the patient reports today. Went to [place] and ate/drank off her dietary plan, so she tells me that she is up on weight for that reason    She's going to work on getting back to exercise    ROS  Review of Systems  A 12 point review of systems was negative except as noted here or in the HPI. OBJECTIVE  Visit Vitals  BP (!) 133/90   Pulse 77   Temp 97.5 °F (36.4 °C) (Temporal)   Resp 18   Ht 5' 2\" (1.575 m)   Wt 244 lb (110.7 kg)   SpO2 99%   BMI 44.63 kg/m²       Physical Exam  Vitals and nursing note reviewed. Constitutional:       General: She is not in acute distress. Appearance: Normal appearance. She is obese. She is not ill-appearing, toxic-appearing or diaphoretic. HENT:      Head: Normocephalic and atraumatic. Right Ear: External ear normal.      Left Ear: External ear normal.      Mouth/Throat:      Mouth: Mucous membranes are moist.   Eyes:      General: No scleral icterus. Right eye: No discharge. Left eye: No discharge. Comments: eom grossly intact   Neck:      Comments: No visible neck masses , ROM appears normal from visual inspection  Cardiovascular:      Rate and Rhythm: Normal rate and regular rhythm. Heart sounds: No friction rub. No gallop. Pulmonary:      Effort: Pulmonary effort is normal. No respiratory distress. Breath sounds: No stridor. No wheezing or rhonchi. Musculoskeletal:      Right lower leg: No edema. Left lower leg: No edema. Skin:     General: Skin is warm and dry. Capillary Refill: Capillary refill takes less than 2 seconds. Findings: No rash. Comments: Visible skin is without jaundice, bruising, lesion, pallor, erythema or rash except as otherwise noted   Neurological:      General: No focal deficit present. Mental Status: She is alert and oriented to person, place, and time. Psychiatric:         Mood and Affect: Mood normal.         Behavior: Behavior normal.         Thought Content: Thought content normal.         Judgment: Judgment normal.       No results found for any visits on 22.     HISTORICAL  Reviewed and updated today, and as noted below:    Past Medical History:   Diagnosis Date    Hypertension      Past Surgical History:   Procedure Laterality Date    HX BREAST REDUCTION      HX  SECTION  2009     Family History   Problem Relation Age of Onset    Diabetes Mother     Hypertension Mother     Kidney Disease Mother     No Known Problems Father      Social History     Tobacco Use   Smoking Status Never   Smokeless Tobacco Never     Social History     Socioeconomic History    Marital status:    Tobacco Use    Smoking status: Never    Smokeless tobacco: Never   Substance and Sexual Activity    Alcohol use: Yes     Comment: socially    Drug use: Never     Allergies   Allergen Reactions    Lisinopril Swelling     Mouth swelling       LAB REVIEW  Lab Results   Component Value Date/Time    Sodium 141 07/30/2021 01:04 PM    Potassium 4.2 07/30/2021 01:04 PM    Chloride 100 07/30/2021 01:04 PM    CO2 26 07/30/2021 01:04 PM    Glucose 90 07/30/2021 01:04 PM    BUN 10 07/30/2021 01:04 PM    Creatinine 0.97 07/30/2021 01:04 PM    BUN/Creatinine ratio 10 07/30/2021 01:04 PM    GFR est AA 83 07/30/2021 01:04 PM    GFR est non-AA 72 07/30/2021 01:04 PM    Calcium 9.8 07/30/2021 01:04 PM    Bilirubin, total 0.3 07/30/2021 01:04 PM    Alk. phosphatase 80 07/30/2021 01:04 PM    Protein, total 7.7 07/30/2021 01:04 PM    Albumin 4.5 07/30/2021 01:04 PM    A-G Ratio 1.4 07/30/2021 01:04 PM    ALT (SGPT) 9 07/30/2021 01:04 PM     Lab Results   Component Value Date/Time    WBC 6.8 07/30/2021 01:04 PM    HGB 14.9 07/30/2021 01:04 PM    HCT 44.3 07/30/2021 01:04 PM    PLATELET 928 64/10/1041 01:04 PM    MCV 91 07/30/2021 01:04 PM     No results found for: HBA1C, MRO8KAPE, REM7FJFU, NDD6JMSJ  Lab Results   Component Value Date/Time    Cholesterol, total 182 07/30/2021 01:04 PM    HDL Cholesterol 46 07/30/2021 01:04 PM    LDL, calculated 120 (H) 07/30/2021 01:04 PM    VLDL, calculated 16 07/30/2021 01:04 PM    Triglyceride 89 07/30/2021 01:04 PM           Sophia Koehler MD  [facility]  09/02/22 9:17 AM    Portions of this note may have been populated using smart dictation software and may have \"sounds-like\" errors present. Pt was counseled on risks, benefits and alternatives of treatment options. All questions were asked and answered and the patient was agreeable with the treatment plan as outlined.

## 2022-09-03 LAB
ALBUMIN SERPL-MCNC: 4.1 G/DL (ref 3.8–4.8)
ALBUMIN/GLOB SERPL: 1.3 {RATIO} (ref 1.2–2.2)
ALP SERPL-CCNC: 68 IU/L (ref 44–121)
ALT SERPL-CCNC: 11 IU/L (ref 0–32)
AST SERPL-CCNC: 13 IU/L (ref 0–40)
BILIRUB SERPL-MCNC: 0.3 MG/DL (ref 0–1.2)
BUN SERPL-MCNC: 9 MG/DL (ref 6–24)
BUN/CREAT SERPL: 10 (ref 9–23)
CALCIUM SERPL-MCNC: 9.6 MG/DL (ref 8.7–10.2)
CHLORIDE SERPL-SCNC: 102 MMOL/L (ref 96–106)
CHOLEST SERPL-MCNC: 142 MG/DL (ref 100–199)
CO2 SERPL-SCNC: 26 MMOL/L (ref 20–29)
CREAT SERPL-MCNC: 0.94 MG/DL (ref 0.57–1)
EGFR: 77 ML/MIN/1.73
ERYTHROCYTE [DISTWIDTH] IN BLOOD BY AUTOMATED COUNT: 13 % (ref 11.7–15.4)
GLOBULIN SER CALC-MCNC: 3.1 G/DL (ref 1.5–4.5)
GLUCOSE SERPL-MCNC: 88 MG/DL (ref 65–99)
HCT VFR BLD AUTO: 42.3 % (ref 34–46.6)
HDLC SERPL-MCNC: 41 MG/DL
HGB BLD-MCNC: 14.2 G/DL (ref 11.1–15.9)
IMP & REVIEW OF LAB RESULTS: NORMAL
LDLC SERPL CALC-MCNC: 86 MG/DL (ref 0–99)
MCH RBC QN AUTO: 30.2 PG (ref 26.6–33)
MCHC RBC AUTO-ENTMCNC: 33.6 G/DL (ref 31.5–35.7)
MCV RBC AUTO: 90 FL (ref 79–97)
PLATELET # BLD AUTO: 250 X10E3/UL (ref 150–450)
POTASSIUM SERPL-SCNC: 4.3 MMOL/L (ref 3.5–5.2)
PROT SERPL-MCNC: 7.2 G/DL (ref 6–8.5)
RBC # BLD AUTO: 4.7 X10E6/UL (ref 3.77–5.28)
SODIUM SERPL-SCNC: 140 MMOL/L (ref 134–144)
TRIGL SERPL-MCNC: 78 MG/DL (ref 0–149)
VLDLC SERPL CALC-MCNC: 15 MG/DL (ref 5–40)
WBC # BLD AUTO: 5.7 X10E3/UL (ref 3.4–10.8)
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
## 2022-11-21 ENCOUNTER — TELEPHONE (OUTPATIENT)
Dept: FAMILY MEDICINE CLINIC | Age: 42
End: 2022-11-21

## 2022-11-21 NOTE — TELEPHONE ENCOUNTER
Pt called to say that [facility] is not refilling her VAlsartan and that she only has three pills left. There are three refills left on this and is confused at to why this is happening.      Kristie Armendariz

## 2022-11-21 NOTE — TELEPHONE ENCOUNTER
Called [facility] pharmacy did not know of any reason why RX could not be dispensed. He will prepare now for pt. Called pt, and left a voice message advising of this and that her RX should be ready for  this afternoon.

## 2023-07-26 LAB — MAMMOGRAPHY, EXTERNAL: NORMAL

## 2023-08-22 RX ORDER — VALSARTAN AND HYDROCHLOROTHIAZIDE 160; 12.5 MG/1; MG/1
TABLET, FILM COATED ORAL
Qty: 90 TABLET | Refills: 0 | Status: SHIPPED | OUTPATIENT
Start: 2023-08-22

## 2023-08-24 NOTE — TELEPHONE ENCOUNTER
Left voicemail with pt asking to call back to make and OV in person to discuss meds.     Blinda Session

## 2023-11-10 NOTE — TELEPHONE ENCOUNTER
Pt requesting refill for Valsartan to be sent to [facility] pharmacy at [facility]. Please pend medication to provider. Pt scheduled for first available follow up with Dr. Jona Vicente in December and wants her to know she lost 30 pounds.  Nilesh

## 2023-11-10 NOTE — TELEPHONE ENCOUNTER
----- Message from Ray Subramanian MA sent at 11/10/2023  4:23 PM EST -----  Subject: Message to Provider    QUESTIONS  Information for Provider? Pt is scheduled for a visit on 12/20, but is   requesting a sooner visit.  Pt requesting return call at [phone].  ---------------------------------------------------------------------------  --------------  [employer] INFO  [phone]; OK to leave message on voicemail  ---------------------------------------------------------------------------  --------------  SCRIPT ANSWERS  undefined

## 2023-11-13 RX ORDER — VALSARTAN AND HYDROCHLOROTHIAZIDE 160; 12.5 MG/1; MG/1
1 TABLET, FILM COATED ORAL EVERY MORNING
Qty: 90 TABLET | Refills: 0 | Status: SHIPPED | OUTPATIENT
Start: 2023-11-13

## 2024-02-01 ENCOUNTER — OFFICE VISIT (OUTPATIENT)
Facility: CLINIC | Age: 44
End: 2024-02-01
Payer: MEDICAID

## 2024-02-01 VITALS
BODY MASS INDEX: 39.16 KG/M2 | OXYGEN SATURATION: 98 % | WEIGHT: 212.8 LBS | DIASTOLIC BLOOD PRESSURE: 92 MMHG | SYSTOLIC BLOOD PRESSURE: 133 MMHG | HEART RATE: 88 BPM | RESPIRATION RATE: 18 BRPM | TEMPERATURE: 97.3 F | HEIGHT: 62 IN

## 2024-02-01 DIAGNOSIS — J02.9 SORE THROAT: Primary | ICD-10-CM

## 2024-02-01 DIAGNOSIS — I10 ESSENTIAL (PRIMARY) HYPERTENSION: ICD-10-CM

## 2024-02-01 DIAGNOSIS — J04.0 LARYNGITIS: ICD-10-CM

## 2024-02-01 DIAGNOSIS — R49.0 HOARSE: ICD-10-CM

## 2024-02-01 LAB
GROUP A STREP ANTIGEN, POC: ABNORMAL
VALID INTERNAL CONTROL, POC: YES

## 2024-02-01 PROCEDURE — 87880 STREP A ASSAY W/OPTIC: CPT | Performed by: FAMILY MEDICINE

## 2024-02-01 PROCEDURE — 99213 OFFICE O/P EST LOW 20 MIN: CPT | Performed by: FAMILY MEDICINE

## 2024-02-01 RX ORDER — VALSARTAN AND HYDROCHLOROTHIAZIDE 160; 12.5 MG/1; MG/1
1 TABLET, FILM COATED ORAL EVERY MORNING
Qty: 90 TABLET | Refills: 3 | Status: SHIPPED | OUTPATIENT
Start: 2024-02-01

## 2024-02-01 RX ORDER — PHENTERMINE HYDROCHLORIDE 37.5 MG/1
37.5 CAPSULE ORAL EVERY MORNING
COMMUNITY
Start: 2024-01-24

## 2024-02-01 SDOH — ECONOMIC STABILITY: FOOD INSECURITY: WITHIN THE PAST 12 MONTHS, THE FOOD YOU BOUGHT JUST DIDN'T LAST AND YOU DIDN'T HAVE MONEY TO GET MORE.: NEVER TRUE

## 2024-02-01 SDOH — ECONOMIC STABILITY: FOOD INSECURITY: WITHIN THE PAST 12 MONTHS, YOU WORRIED THAT YOUR FOOD WOULD RUN OUT BEFORE YOU GOT MONEY TO BUY MORE.: NEVER TRUE

## 2024-02-01 SDOH — ECONOMIC STABILITY: HOUSING INSECURITY
IN THE LAST 12 MONTHS, WAS THERE A TIME WHEN YOU DID NOT HAVE A STEADY PLACE TO SLEEP OR SLEPT IN A SHELTER (INCLUDING NOW)?: NO

## 2024-02-01 SDOH — ECONOMIC STABILITY: INCOME INSECURITY: HOW HARD IS IT FOR YOU TO PAY FOR THE VERY BASICS LIKE FOOD, HOUSING, MEDICAL CARE, AND HEATING?: NOT HARD AT ALL

## 2024-02-01 ASSESSMENT — PATIENT HEALTH QUESTIONNAIRE - PHQ9
2. FEELING DOWN, DEPRESSED OR HOPELESS: 0
1. LITTLE INTEREST OR PLEASURE IN DOING THINGS: 0
SUM OF ALL RESPONSES TO PHQ QUESTIONS 1-9: 0
SUM OF ALL RESPONSES TO PHQ9 QUESTIONS 1 & 2: 0
SUM OF ALL RESPONSES TO PHQ QUESTIONS 1-9: 0

## 2024-02-01 NOTE — PATIENT INSTRUCTIONS
Upper respiratory infections can be both bacterial and viral but in this case we suspect viral. Antibiotics are only indicated when bacterial infections are either strongly suspected or confirmed. Supportive care is appropriate in both cases. Patients with URIs benefit from humidified air, increased fluid consumption, over the counter pain and fever reducers (Ibuprofen, acetaminophen). If not contraindicated, may also use over the counter cough/cold medications, however patients with high blood pressure or risk factors for stroke should not use decongestant medications such as phenylephrine or pseudoephedrine. Nasal saline rinses are appropriate for use, and in general the use of Fluticasone nasal spray (2 sprays in each nostril once daily) can help with congestion--this is available over the counter. For sore throat over the counter cough drops and sore throat drops (for example Cepacol or Chloraseptic) can be used as well as salt water gargles and hot or cold beverages for comfort as needed. Over the counter cough medications can be used, as can honey for cough suppression.  If symptoms are not improved by 10-14 days or are improving then acutely worsen, patient is recommended to return to the office for re-evaluation of symptoms.

## 2024-02-01 NOTE — PROGRESS NOTES
Chief Complaint   Patient presents with    Hoarse     Started Tuesday, patient denies pain.     Headache     Ears popping.     1. Have you been to the ER, urgent care clinic since your last visit?  Hospitalized since your last visit?No    2. Have you seen or consulted any other health care providers outside of the [facility] since your last visit?  Include any pap smears or colon screening. No    
really related to talking  Went to work today    No fevers    No chest pain  No gi symptoms      ROS  Review of Systems  A 12 point review of systems was negative except as noted here or in the HPI.    OBJECTIVE  BP (!) 133/92 (Site: Left Upper Arm, Position: Sitting, Cuff Size: Large Adult)   Pulse 88   Temp 97.3 °F (36.3 °C) (Temporal)   Resp 18   Ht 1.575 m (5' 2\")   Wt 96.5 kg (212 lb 12.8 oz)   LMP 01/28/2024 (Exact Date)   SpO2 98%   BMI 38.92 kg/m²     Physical Exam  Vitals and nursing note reviewed.   Constitutional:       General: She is not in acute distress.     Appearance: Normal appearance. She is obese. She is not toxic-appearing.      Comments: Ambulating to room   HENT:      Head: Normocephalic and atraumatic.      Right Ear: Tympanic membrane and external ear normal.      Left Ear: Tympanic membrane and external ear normal.      Nose: Congestion present.      Mouth/Throat:      Mouth: Mucous membranes are moist.      Comments: Mild erythema of posterior oropharynx  Eyes:      General: No scleral icterus.     Extraocular Movements: Extraocular movements intact.      Pupils: Pupils are equal, round, and reactive to light.   Neck:      Vascular: No carotid bruit.   Cardiovascular:      Rate and Rhythm: Normal rate and regular rhythm.      Heart sounds: No murmur heard.     No friction rub. No gallop.   Pulmonary:      Effort: Pulmonary effort is normal. No respiratory distress.      Breath sounds: Normal breath sounds. No stridor. No wheezing or rhonchi.   Abdominal:      General: Bowel sounds are normal.      Palpations: Abdomen is soft.   Musculoskeletal:      Cervical back: No rigidity.      Right lower leg: No edema.      Left lower leg: No edema.   Skin:     General: Skin is warm and dry.      Capillary Refill: Capillary refill takes less than 2 seconds.      Findings: No rash.   Neurological:      General: No focal deficit present.      Mental Status: She is alert and oriented to person,

## 2024-10-14 SDOH — HEALTH STABILITY: PHYSICAL HEALTH: ON AVERAGE, HOW MANY DAYS PER WEEK DO YOU ENGAGE IN MODERATE TO STRENUOUS EXERCISE (LIKE A BRISK WALK)?: 3 DAYS

## 2024-10-14 SDOH — HEALTH STABILITY: PHYSICAL HEALTH: ON AVERAGE, HOW MANY MINUTES DO YOU ENGAGE IN EXERCISE AT THIS LEVEL?: 30 MIN

## 2024-10-15 ENCOUNTER — OFFICE VISIT (OUTPATIENT)
Facility: CLINIC | Age: 44
End: 2024-10-15
Payer: COMMERCIAL

## 2024-10-15 VITALS
OXYGEN SATURATION: 99 % | TEMPERATURE: 97.7 F | DIASTOLIC BLOOD PRESSURE: 62 MMHG | SYSTOLIC BLOOD PRESSURE: 98 MMHG | BODY MASS INDEX: 39.99 KG/M2 | HEART RATE: 85 BPM | RESPIRATION RATE: 16 BRPM | WEIGHT: 211.8 LBS | HEIGHT: 61 IN

## 2024-10-15 DIAGNOSIS — I10 PRIMARY HYPERTENSION: ICD-10-CM

## 2024-10-15 DIAGNOSIS — Z76.89 ENCOUNTER TO ESTABLISH CARE: Primary | ICD-10-CM

## 2024-10-15 DIAGNOSIS — Z12.12 ENCOUNTER FOR SCREENING FOR COLORECTAL MALIGNANT NEOPLASM: ICD-10-CM

## 2024-10-15 DIAGNOSIS — Z12.11 ENCOUNTER FOR SCREENING FOR COLORECTAL MALIGNANT NEOPLASM: ICD-10-CM

## 2024-10-15 DIAGNOSIS — Z13.6 ENCOUNTER FOR LIPID SCREENING FOR CARDIOVASCULAR DISEASE: ICD-10-CM

## 2024-10-15 DIAGNOSIS — Z13.1 SCREENING FOR DIABETES MELLITUS: ICD-10-CM

## 2024-10-15 DIAGNOSIS — Z13.220 ENCOUNTER FOR LIPID SCREENING FOR CARDIOVASCULAR DISEASE: ICD-10-CM

## 2024-10-15 PROCEDURE — 3078F DIAST BP <80 MM HG: CPT

## 2024-10-15 PROCEDURE — 3074F SYST BP LT 130 MM HG: CPT

## 2024-10-15 PROCEDURE — 99214 OFFICE O/P EST MOD 30 MIN: CPT

## 2024-10-15 RX ORDER — VALSARTAN AND HYDROCHLOROTHIAZIDE 80; 12.5 MG/1; MG/1
1 TABLET, FILM COATED ORAL DAILY
Qty: 90 TABLET | Refills: 1 | Status: SHIPPED | OUTPATIENT
Start: 2024-10-15

## 2024-10-15 ASSESSMENT — ENCOUNTER SYMPTOMS
VOMITING: 0
NAUSEA: 0
SHORTNESS OF BREATH: 0
CHEST TIGHTNESS: 0
WHEEZING: 0
ABDOMINAL PAIN: 0
BACK PAIN: 0
COUGH: 0
CONSTIPATION: 0
DIARRHEA: 0

## 2024-10-15 NOTE — PROGRESS NOTES
Jon Moss  44 y.o. female  1980  [address] [facility] [address]  [id number]     [facility]: Progress Note       Encounter Date: 10/15/2024    Patient presents with the following chief complaint(s)    Chief Complaint   Patient presents with    Established New Doctor        History provided by patient    Assessment and Plan:   1. Encounter to establish care  2. Primary hypertension  Comments:  Labs ordered. Decrease valsartan-HCTZ to 80-12.5 given readings today. Pt to monitor BP at home. F/u 4 wks for BP check  Orders:  -     CBC; Future  -     Thyroid Cascade Profile; Future  -     Comprehensive Metabolic Panel; Future  -     valsartan-hydroCHLOROthiazide (DIOVAN-HCT) 80-12.5 MG per tablet; Take 1 tablet by mouth daily, Disp-90 tablet, R-1Normal  3. Encounter for screening for colorectal malignant neoplasm  Comments:  Labs ordered  Orders:  -     Cologuard (Fecal DNA Colorectal Cancer Screening)  4. Encounter for lipid screening for cardiovascular disease  Comments:  Labs ordered  Orders:  -     Lipid Panel; Future  5. Screening for diabetes mellitus  Comments:  Labs ordered  Orders:  -     Hemoglobin A1C; Future         Return in about 4 weeks (around 11/12/2024) for NV BP check.  History of Present Illness   Jon Moss is a 44 y.o. female with past medical history listed, who presents to clinic today as a new patient to me to establish care. As such, pt agrees to obtain baseline lab work for evaluation.    HTN- Pt has hx of HTN, dx at least 9 years go. On valsartan-HCTZ 160-12.5 x 9 years. Has loss weight since starting medication. Today BP is slightly low. Pt using wrist cuff to check BP at  home. Says that readings WNL. She is tolerating medication well and is motivated to ultimately d/c BP meds.      BMI- says that she has lost weight over the last few years following a diet high in protein. Drinking 90oz of water/day. Walks while at

## 2024-10-15 NOTE — PROGRESS NOTES
\"Have you been to the ER, urgent care clinic since your last visit?  Hospitalized since your last visit?\"    NO    “Have you seen or consulted any other health care providers outside our system since your last visit?”    NO    Have you had a mammogram?”   NO    No breast cancer screening on file      “Have you had a pap smear?”    NO    No cervical cancer screening on file       “Have you had a colorectal cancer screening such as a colonoscopy/FIT/Cologuard?    NO    No colonoscopy on file  No cologuard on file  No FIT/FOBT on file   No flexible sigmoidoscopy on file     Chief Complaint   Patient presents with    Established New Doctor     BP (!) 105/56 (Site: Left Upper Arm, Position: Sitting, Cuff Size: Large Adult)   Pulse 85   Temp 97.7 °F (36.5 °C) (Oral)   Resp 16   Ht 1.549 m (5' 1\")   Wt 96.1 kg (211 lb 12.8 oz)   LMP 09/15/2024 (Approximate)   SpO2 99%   BMI 40.02 kg/m²

## 2024-11-26 ENCOUNTER — NURSE ONLY (OUTPATIENT)
Facility: CLINIC | Age: 44
End: 2024-11-26

## 2024-11-26 VITALS — SYSTOLIC BLOOD PRESSURE: 130 MMHG | DIASTOLIC BLOOD PRESSURE: 78 MMHG

## 2025-04-18 DIAGNOSIS — I10 PRIMARY HYPERTENSION: ICD-10-CM

## 2025-04-18 RX ORDER — VALSARTAN AND HYDROCHLOROTHIAZIDE 80; 12.5 MG/1; MG/1
1 TABLET, FILM COATED ORAL DAILY
Qty: 30 TABLET | Refills: 5 | OUTPATIENT
Start: 2025-04-18

## 2025-04-25 ENCOUNTER — TELEPHONE (OUTPATIENT)
Facility: CLINIC | Age: 45
End: 2025-04-25

## 2025-04-25 DIAGNOSIS — I10 PRIMARY HYPERTENSION: ICD-10-CM

## 2025-04-25 RX ORDER — VALSARTAN AND HYDROCHLOROTHIAZIDE 80; 12.5 MG/1; MG/1
1 TABLET, FILM COATED ORAL DAILY
Qty: 60 TABLET | Refills: 0 | Status: SHIPPED | OUTPATIENT
Start: 2025-04-25

## 2025-04-25 NOTE — TELEPHONE ENCOUNTER
Patient requesting refill on     valsartan-hydroCHLOROthiazide (DIOVAN-HCT) 80-12.5 MG per tablet [     She took her last one yesterday and she has made an appointment for 5/27

## 2025-05-18 DIAGNOSIS — I10 PRIMARY HYPERTENSION: ICD-10-CM

## 2025-05-19 RX ORDER — VALSARTAN AND HYDROCHLOROTHIAZIDE 80; 12.5 MG/1; MG/1
1 TABLET, FILM COATED ORAL DAILY
Qty: 90 TABLET | Refills: 1 | OUTPATIENT
Start: 2025-05-19

## 2025-05-27 ENCOUNTER — OFFICE VISIT (OUTPATIENT)
Facility: CLINIC | Age: 45
End: 2025-05-27
Payer: COMMERCIAL

## 2025-05-27 VITALS
BODY MASS INDEX: 38.89 KG/M2 | HEIGHT: 61 IN | RESPIRATION RATE: 16 BRPM | DIASTOLIC BLOOD PRESSURE: 88 MMHG | WEIGHT: 206 LBS | TEMPERATURE: 98.1 F | SYSTOLIC BLOOD PRESSURE: 138 MMHG | HEART RATE: 88 BPM

## 2025-05-27 DIAGNOSIS — E66.812 CLASS 2 SEVERE OBESITY DUE TO EXCESS CALORIES WITH SERIOUS COMORBIDITY AND BODY MASS INDEX (BMI) OF 38.0 TO 38.9 IN ADULT (HCC): ICD-10-CM

## 2025-05-27 DIAGNOSIS — I10 PRIMARY HYPERTENSION: Primary | ICD-10-CM

## 2025-05-27 DIAGNOSIS — E66.01 CLASS 2 SEVERE OBESITY DUE TO EXCESS CALORIES WITH SERIOUS COMORBIDITY AND BODY MASS INDEX (BMI) OF 38.0 TO 38.9 IN ADULT (HCC): ICD-10-CM

## 2025-05-27 PROCEDURE — 3075F SYST BP GE 130 - 139MM HG: CPT

## 2025-05-27 PROCEDURE — 3079F DIAST BP 80-89 MM HG: CPT

## 2025-05-27 PROCEDURE — 99213 OFFICE O/P EST LOW 20 MIN: CPT

## 2025-05-27 RX ORDER — VALSARTAN AND HYDROCHLOROTHIAZIDE 80; 12.5 MG/1; MG/1
1 TABLET, FILM COATED ORAL DAILY
Qty: 90 TABLET | Refills: 0 | Status: SHIPPED | OUTPATIENT
Start: 2025-05-27

## 2025-05-27 SDOH — ECONOMIC STABILITY: FOOD INSECURITY: WITHIN THE PAST 12 MONTHS, YOU WORRIED THAT YOUR FOOD WOULD RUN OUT BEFORE YOU GOT MONEY TO BUY MORE.: NEVER TRUE

## 2025-05-27 SDOH — ECONOMIC STABILITY: FOOD INSECURITY: WITHIN THE PAST 12 MONTHS, THE FOOD YOU BOUGHT JUST DIDN'T LAST AND YOU DIDN'T HAVE MONEY TO GET MORE.: NEVER TRUE

## 2025-05-27 SDOH — ECONOMIC STABILITY: TRANSPORTATION INSECURITY
IN THE PAST 12 MONTHS, HAS THE LACK OF TRANSPORTATION KEPT YOU FROM MEDICAL APPOINTMENTS OR FROM GETTING MEDICATIONS?: NO

## 2025-05-27 SDOH — ECONOMIC STABILITY: INCOME INSECURITY: IN THE LAST 12 MONTHS, WAS THERE A TIME WHEN YOU WERE NOT ABLE TO PAY THE MORTGAGE OR RENT ON TIME?: NO

## 2025-05-27 SDOH — ECONOMIC STABILITY: TRANSPORTATION INSECURITY
IN THE PAST 12 MONTHS, HAS LACK OF TRANSPORTATION KEPT YOU FROM MEETINGS, WORK, OR FROM GETTING THINGS NEEDED FOR DAILY LIVING?: NO

## 2025-05-27 ASSESSMENT — ENCOUNTER SYMPTOMS
VOMITING: 0
NAUSEA: 0
DIARRHEA: 0
ABDOMINAL PAIN: 0
CONSTIPATION: 0
COUGH: 0
BACK PAIN: 0
CHEST TIGHTNESS: 0
SHORTNESS OF BREATH: 0
WHEEZING: 0

## 2025-05-27 ASSESSMENT — PATIENT HEALTH QUESTIONNAIRE - PHQ9
2. FEELING DOWN, DEPRESSED OR HOPELESS: NOT AT ALL
1. LITTLE INTEREST OR PLEASURE IN DOING THINGS: NOT AT ALL
SUM OF ALL RESPONSES TO PHQ QUESTIONS 1-9: 0

## 2025-05-27 NOTE — PROGRESS NOTES
Have you been to the ER, urgent care clinic since your last visit?  Hospitalized since your last visit?   NO    Have you seen or consulted any other health care providers outside our system since your last visit?   NO     “Have you had a pap smear?”    YES - Where: Dr. Lala Aldridge Nurse/CMA to request most recent records if not in the chart    No cervical cancer screening on file       “Have you had a colorectal cancer screening such as a colonoscopy/FIT/Cologuard?    NO    No colonoscopy on file  No cologuard on file  No FIT/FOBT on file   No flexible sigmoidoscopy on file     Chief Complaint   Patient presents with    Follow-up     /88 (BP Site: Left Upper Arm, Patient Position: Sitting, BP Cuff Size: Large Adult)   Pulse 88   Temp 98.1 °F (36.7 °C) (Skin)   Resp 16   Ht 1.549 m (5' 1\")   Wt 93.4 kg (206 lb)   LMP  (LMP Unknown)   BMI 38.92 kg/m²        
provider specified.  Future Appointments   Date Time Provider Department Center   8/5/2025  4:30 PM Gretchen Suero PA-C [facility]